# Patient Record
Sex: FEMALE | Race: WHITE | HISPANIC OR LATINO | Employment: FULL TIME | URBAN - METROPOLITAN AREA
[De-identification: names, ages, dates, MRNs, and addresses within clinical notes are randomized per-mention and may not be internally consistent; named-entity substitution may affect disease eponyms.]

---

## 2024-05-09 ENCOUNTER — OFFICE VISIT (OUTPATIENT)
Dept: PRIMARY CARE | Facility: CLINIC | Age: 34
End: 2024-05-09
Payer: COMMERCIAL

## 2024-05-09 VITALS
HEIGHT: 64 IN | DIASTOLIC BLOOD PRESSURE: 58 MMHG | BODY MASS INDEX: 27.83 KG/M2 | SYSTOLIC BLOOD PRESSURE: 102 MMHG | HEART RATE: 64 BPM | OXYGEN SATURATION: 99 % | WEIGHT: 163 LBS

## 2024-05-09 DIAGNOSIS — J02.0 STREP SORE THROAT: Primary | ICD-10-CM

## 2024-05-09 DIAGNOSIS — J02.9 SORE THROAT: ICD-10-CM

## 2024-05-09 DIAGNOSIS — Z20.818 EXPOSURE TO STREP THROAT: ICD-10-CM

## 2024-05-09 PROBLEM — O13.9 GESTATIONAL HYPERTENSION, ANTEPARTUM (HHS-HCC): Status: ACTIVE | Noted: 2023-09-18

## 2024-05-09 PROBLEM — E34.8 PINEAL GLAND CYST: Status: ACTIVE | Noted: 2023-02-01

## 2024-05-09 PROBLEM — O42.90 LEAKAGE OF AMNIOTIC FLUID (HHS-HCC): Status: RESOLVED | Noted: 2023-09-16 | Resolved: 2024-05-09

## 2024-05-09 PROBLEM — L50.3 DERMOGRAPHISM: Status: ACTIVE | Noted: 2024-05-09

## 2024-05-09 PROBLEM — O42.90 LEAKAGE OF AMNIOTIC FLUID (HHS-HCC): Status: ACTIVE | Noted: 2023-09-16

## 2024-05-09 PROBLEM — N39.0 ACUTE LOWER UTI: Status: ACTIVE | Noted: 2024-05-09

## 2024-05-09 PROBLEM — R51.9 FREQUENT HEADACHES: Status: ACTIVE | Noted: 2024-05-09

## 2024-05-09 PROBLEM — B86 SCABIES: Status: ACTIVE | Noted: 2024-05-09

## 2024-05-09 PROBLEM — O13.9 GESTATIONAL HYPERTENSION, ANTEPARTUM (HHS-HCC): Status: RESOLVED | Noted: 2023-09-18 | Resolved: 2024-05-09

## 2024-05-09 LAB — POC RAPID STREP: NEGATIVE

## 2024-05-09 PROCEDURE — 1036F TOBACCO NON-USER: CPT | Performed by: NURSE PRACTITIONER

## 2024-05-09 PROCEDURE — 99214 OFFICE O/P EST MOD 30 MIN: CPT | Performed by: NURSE PRACTITIONER

## 2024-05-09 PROCEDURE — 87880 STREP A ASSAY W/OPTIC: CPT | Performed by: NURSE PRACTITIONER

## 2024-05-09 RX ORDER — TIMOLOL MALEATE 5 MG/ML
1 SOLUTION/ DROPS OPHTHALMIC DAILY
COMMUNITY

## 2024-05-09 RX ORDER — AMOXICILLIN 500 MG/1
500 CAPSULE ORAL 2 TIMES DAILY
Qty: 20 CAPSULE | Refills: 0 | Status: SHIPPED | OUTPATIENT
Start: 2024-05-09 | End: 2024-05-19

## 2024-05-09 ASSESSMENT — ENCOUNTER SYMPTOMS
HEADACHES: 1
ABDOMINAL PAIN: 0
SORE THROAT: 1
TROUBLE SWALLOWING: 1
COUGH: 1

## 2024-05-09 ASSESSMENT — PATIENT HEALTH QUESTIONNAIRE - PHQ9
1. LITTLE INTEREST OR PLEASURE IN DOING THINGS: NOT AT ALL
2. FEELING DOWN, DEPRESSED OR HOPELESS: NOT AT ALL
SUM OF ALL RESPONSES TO PHQ9 QUESTIONS 1 & 2: 0

## 2024-05-09 NOTE — PROGRESS NOTES
"Subjective   Patient ID: Юлия Ramos is a 33 y.o. female who presents for possible strep throat.    Sore Throat   The current episode started in the past 7 days. The problem has been gradually worsening. The pain is worse on the left side. There has been no fever. The pain is at a severity of 6/10. Associated symptoms include congestion, coughing, headaches, a plugged ear sensation and trouble swallowing. Pertinent negatives include no abdominal pain. She has had exposure to strep.        Both  and baby are ill being tx with antibiotics.      was diagnosed yesterday with strep.   She is c/o sore throat, runny nose, sinus congestion and sneezing.   Denies  SOB or GI upset.     Review of Systems   HENT:  Positive for congestion, sore throat and trouble swallowing.    Respiratory:  Positive for cough.    Gastrointestinal:  Negative for abdominal pain.   Neurological:  Positive for headaches.       Objective   /58   Pulse 64   Ht 1.626 m (5' 4\")   Wt 73.9 kg (163 lb)   SpO2 99%   BMI 27.98 kg/m²     Physical Exam    Alert and oriented x 3  No cervical lymphadenopathy noted  Oral cavity with mild erythema, no white patches noted   Does not appear/sound dyspneic with conversation  Speech clear.  Hearing adequate.  Psych: Normal affect. Good judgment and insight.       Assessment/Plan     1. Sore throat  - POCT Rapid Strep A manually resulted  - amoxicillin (Amoxil) 500 mg capsule; Take 1 capsule (500 mg) by mouth 2 times a day for 10 days.  Dispense: 20 capsule; Refill: 0    2. Strep sore throat/Exposure to strep throat:   Complete prescribed antibiotics as directed. Eat yogurt or take a probiotic (not within the hour of taking the antibiotic) while taking antibiotics.   Increase fluid intake throughout the day.    Throw out current toothbrushes  Change sheets   - amoxicillin (Amoxil) 500 mg capsule; Take 1 capsule (500 mg) by mouth 2 times a day for 10 days.  Dispense: 20 capsule; Refill: " 0

## 2024-06-10 ENCOUNTER — OFFICE VISIT (OUTPATIENT)
Dept: PRIMARY CARE | Facility: CLINIC | Age: 34
End: 2024-06-10
Payer: COMMERCIAL

## 2024-06-10 VITALS
WEIGHT: 166 LBS | TEMPERATURE: 97.7 F | BODY MASS INDEX: 28.49 KG/M2 | DIASTOLIC BLOOD PRESSURE: 78 MMHG | SYSTOLIC BLOOD PRESSURE: 120 MMHG

## 2024-06-10 DIAGNOSIS — L23.7 ALLERGIC CONTACT DERMATITIS DUE TO PLANTS, EXCEPT FOOD: Primary | ICD-10-CM

## 2024-06-10 PROCEDURE — 99213 OFFICE O/P EST LOW 20 MIN: CPT | Performed by: FAMILY MEDICINE

## 2024-06-10 PROCEDURE — 1036F TOBACCO NON-USER: CPT | Performed by: FAMILY MEDICINE

## 2024-06-10 RX ORDER — METHYLPREDNISOLONE 4 MG/1
TABLET ORAL
Qty: 21 TABLET | Refills: 0 | Status: SHIPPED | OUTPATIENT
Start: 2024-06-10 | End: 2024-06-17

## 2024-06-10 ASSESSMENT — ENCOUNTER SYMPTOMS
SORE THROAT: 0
DIARRHEA: 0
VOMITING: 0
ANOREXIA: 0
EYE PAIN: 0
FATIGUE: 0
SHORTNESS OF BREATH: 0
NAIL CHANGES: 0
RHINORRHEA: 0
COUGH: 0
FEVER: 0

## 2024-06-10 ASSESSMENT — PATIENT HEALTH QUESTIONNAIRE - PHQ9
2. FEELING DOWN, DEPRESSED OR HOPELESS: NOT AT ALL
1. LITTLE INTEREST OR PLEASURE IN DOING THINGS: NOT AT ALL
SUM OF ALL RESPONSES TO PHQ9 QUESTIONS 1 AND 2: 0

## 2024-06-10 NOTE — PATIENT INSTRUCTIONS
I prescribed a course of steroids for this rash.  Benadryl and topical cortisone can be used as well. Return in one or two weeks if it persists.

## 2024-06-10 NOTE — PROGRESS NOTES
Subjective   Patient ID: [id number]     Юлия Ramos is a 33 y.o. female who presents for Rash (Left wrist.).  Rash  This is a new problem. The current episode started in the past 7 days. The problem has been gradually worsening since onset. The affected locations include the torso and left wrist. The rash is characterized by blistering, redness, draining and itchiness. It is unknown if there was an exposure to a precipitant. Pertinent negatives include no anorexia, congestion, cough, diarrhea, eye pain, facial edema, fatigue, fever, joint pain, nail changes, rhinorrhea, shortness of breath, sore throat or vomiting.     She complains of a rash on her left wrist.    It started within a week.  It started with itching then red bumps with blisters and oozing.  No pain, just itching.     The itching is no worse at night than in the day.  It is localized at the left wrist.  Nowhere else.  No web space involvement.     This developed shortly after mowing the lawn.  No known poison ivy.  Objective     /78 (BP Location: Right arm, Patient Position: Sitting)   Temp 36.5 °C (97.7 °F) (Skin)   Wt 75.3 kg (166 lb)   BMI 28.49 kg/m²      Physical Exam  Constitutional:       General: She is not in acute distress.     Appearance: She is not ill-appearing or toxic-appearing.   Pulmonary:      Effort: No respiratory distress.      Breath sounds: No wheezing.   Skin:     Comments: Left volar wrist.  Several papules, vesicles. Red, pruritic.  Nontender.  No drainage.               Assessment/Plan   Problem List Items Addressed This Visit    None  Visit Diagnoses       Allergic contact dermatitis due to plants, except food    -  Primary    Relevant Medications    methylPREDNISolone (Medrol Dospak) 4 mg tablets        I prescribed a course of steroids for this rash.  Benadryl and topical cortisone can be used as well. Return in one or two weeks if it persists.    Satish Phillips,

## 2025-04-29 PROBLEM — J02.0 STREP SORE THROAT: Status: RESOLVED | Noted: 2024-05-09 | Resolved: 2025-04-29

## 2025-04-29 PROBLEM — J02.9 SORE THROAT: Status: RESOLVED | Noted: 2024-05-09 | Resolved: 2025-04-29

## 2025-04-29 PROBLEM — Z00.00 HEALTH CARE MAINTENANCE: Status: ACTIVE | Noted: 2025-04-29

## 2025-04-29 PROBLEM — N39.0 ACUTE LOWER UTI: Status: RESOLVED | Noted: 2024-05-09 | Resolved: 2025-04-29

## 2025-04-29 PROBLEM — B86 SCABIES: Status: RESOLVED | Noted: 2024-05-09 | Resolved: 2025-04-29

## 2025-04-29 PROBLEM — Z20.818 EXPOSURE TO STREP THROAT: Status: RESOLVED | Noted: 2024-05-09 | Resolved: 2025-04-29

## 2025-04-29 ASSESSMENT — PROMIS GLOBAL HEALTH SCALE
CARRYOUT_SOCIAL_ACTIVITIES: EXCELLENT
RATE_QUALITY_OF_LIFE: VERY GOOD
RATE_AVERAGE_PAIN: 1
RATE_PHYSICAL_HEALTH: GOOD
RATE_GENERAL_HEALTH: GOOD
EMOTIONAL_PROBLEMS: SOMETIMES
RATE_SOCIAL_SATISFACTION: EXCELLENT
CARRYOUT_PHYSICAL_ACTIVITIES: MOSTLY
RATE_AVERAGE_FATIGUE: MILD
RATE_MENTAL_HEALTH: GOOD

## 2025-04-29 NOTE — PATIENT INSTRUCTIONS
Remember to schedule your PAP smear.    Your dental health affects your overall health and as such you should see the dentist at least every six months.      Please call if you have any questions regarding the Living Will and/or the Durable Power of  for Healthcare.  If you decide to proceed, get your signature notarized and provide us a copy for your file.     Exercising for 30' three times weekly, at an intensity that makes it difficult to say The Pledge of Allegiance on one breath, is a good basic exercise program    You are eligible for the COVID booster.  Without a recent (within 90 days) challenge (booster or infection) your immune system will be three days behind in protecting you from the infection.  You will infect approximately five people by that time.

## 2025-04-29 NOTE — PROGRESS NOTES
"Subjective   Patient ID: [id number]     Юлия Ramos is a 34 y.o. female who presents for Annual Exam.    HPI  Ms. Ramos presents for a complete physical.    Review of Systems  General-denies weakness or myalgias;  no unexplained fever or chills  Cardiovascular- No chest pain or pressure, nausea, diaphoresis, paresthesias, dizziness, or syncope with or without exertion  Gastrointestinal-No blood in stool, tarry stools, pain, vomiting, heartburn, constipation or diarrhea  Pulmonary-No wheezing, coughing or shortness of breath  Psychiatric-No complaints regarding libido, appetite, memory or concentration;  no drug use or alcohol usage over six per week  Endocrinology- No change in hair, voice, skin, weight or temperature tolerance   Sleep-No complaints of insomnia, apnea or restless legs  Urology-No frequency, urgency, blood in urine, or incontinence  Musculoskeletal-No locking, giving way/swelling of joints  Neurology- No headaches, hx of concussion, falls in the last year or seizure  Allergy/Immunology-No history of sneezing or itching  Dermatology-No rashes, blanching or  change in any moles   ENT-No hearing loss, tinnitus or vertigo  OPTH-no blurry vision, dry or itchy eyes    Objective     /89 (BP Location: Right arm, Patient Position: Sitting)   Temp 36.8 °C (98.2 °F) (Oral)   Ht 1.638 m (5' 4.5\")   Wt 77.9 kg (171 lb 11.8 oz)   BMI 29.02 kg/m²      Physical Exam  Eyes-pupils equal round, reactive to light and accommodation, fundi with normal cup/disc ratio, conjunctiva without redness or discharge  General- well defined, well nourished, well hydrated individual in NAD  Skin- normal color and turgor; without nail pitting  Head-normocephalic without masses or tenderness  Ears-normal pinnae, and canals, with normal landmarks and light reflex of tympanic membranes bilaterally  Nose-septum in the midline, normal mucosa bilaterally  Throat- without erythema or exudate, uvula in midline  Neck-supple " without lymphadenopathy or thyromegaly; no carotid bruits  Heart- regular rate and rhythm, normal s1 and s2 without murmur or gallop  Lungs-clear to auscultation  Abdomen-soft, positive bowel sounds, without masses, HSmegaly or pain   Extremities- without cords, cyanosis, clubbing or edema;  no nodes/joint swelling  Back-without CVA or spine tenderness    Assessment/Plan     Problem List Items Addressed This Visit       Dermographism    RESOLVED: Frequent headaches    Pineal gland cyst    MRI 2012         Health care maintenance - Primary    Relevant Orders    Lipid Panel    Hepatitis C Antibody     Remember to schedule your PAP smear.    Your dental health affects your overall health and as such you should see the dentist at least every six months.      Please call if you have any questions regarding the Living Will and/or the Durable Power of  for Healthcare.  If you decide to proceed, get your signature notarized and provide us a copy for your file.     Exercising for 30' three times weekly, at an intensity that makes it difficult to say The Pledge of Allegiance on one breath, is a good basic exercise program    You are eligible for the COVID booster.  Without a recent (within 90 days) challenge (booster or infection) your immune system will be three days behind in protecting you from the infection.  You will infect approximately five people by that time.        Neeraj Martini MD

## 2025-04-30 ENCOUNTER — APPOINTMENT (OUTPATIENT)
Dept: PRIMARY CARE | Facility: CLINIC | Age: 35
End: 2025-04-30
Payer: COMMERCIAL

## 2025-04-30 VITALS
SYSTOLIC BLOOD PRESSURE: 130 MMHG | DIASTOLIC BLOOD PRESSURE: 89 MMHG | WEIGHT: 171.74 LBS | BODY MASS INDEX: 28.61 KG/M2 | HEIGHT: 65 IN | TEMPERATURE: 98.2 F

## 2025-04-30 DIAGNOSIS — R51.9 FREQUENT HEADACHES: ICD-10-CM

## 2025-04-30 DIAGNOSIS — E34.8 PINEAL GLAND CYST: ICD-10-CM

## 2025-04-30 DIAGNOSIS — L50.3 DERMOGRAPHISM: ICD-10-CM

## 2025-04-30 DIAGNOSIS — Z00.00 HEALTH CARE MAINTENANCE: Primary | ICD-10-CM

## 2025-04-30 PROCEDURE — 99395 PREV VISIT EST AGE 18-39: CPT | Performed by: FAMILY MEDICINE

## 2025-04-30 PROCEDURE — 1036F TOBACCO NON-USER: CPT | Performed by: FAMILY MEDICINE

## 2025-04-30 PROCEDURE — 3008F BODY MASS INDEX DOCD: CPT | Performed by: FAMILY MEDICINE

## 2025-05-02 LAB
CHOLEST SERPL-MCNC: 207 MG/DL
CHOLEST/HDLC SERPL: 2.9 (CALC)
HCV AB SERPL QL IA: NORMAL
HDLC SERPL-MCNC: 72 MG/DL
LDLC SERPL CALC-MCNC: 109 MG/DL (CALC)
NONHDLC SERPL-MCNC: 135 MG/DL (CALC)
TRIGL SERPL-MCNC: 143 MG/DL

## 2025-05-04 PROBLEM — E78.5 HYPERLIPIDEMIA: Status: ACTIVE | Noted: 2025-05-04
